# Patient Record
Sex: MALE | NOT HISPANIC OR LATINO | Employment: FULL TIME | URBAN - METROPOLITAN AREA
[De-identification: names, ages, dates, MRNs, and addresses within clinical notes are randomized per-mention and may not be internally consistent; named-entity substitution may affect disease eponyms.]

---

## 2023-07-26 ENCOUNTER — HOSPITAL ENCOUNTER (EMERGENCY)
Facility: CLINIC | Age: 33
Discharge: HOME | End: 2023-07-26
Attending: PHYSICIAN ASSISTANT | Admitting: PHYSICIAN ASSISTANT
Payer: OTHER MISCELLANEOUS

## 2023-07-26 VITALS
BODY MASS INDEX: 21.22 KG/M2 | HEART RATE: 60 BPM | RESPIRATION RATE: 18 BRPM | SYSTOLIC BLOOD PRESSURE: 141 MMHG | HEIGHT: 68 IN | DIASTOLIC BLOOD PRESSURE: 82 MMHG | TEMPERATURE: 98 F | WEIGHT: 140 LBS

## 2023-07-26 DIAGNOSIS — S05.01XA ABRASION OF RIGHT CORNEA, INITIAL ENCOUNTER: ICD-10-CM

## 2023-07-26 PROCEDURE — 250N000011 HC RX IP 250 OP 636: Performed by: EMERGENCY MEDICINE

## 2023-07-26 PROCEDURE — 250N000009 HC RX 250: Performed by: PHYSICIAN ASSISTANT

## 2023-07-26 PROCEDURE — 90715 TDAP VACCINE 7 YRS/> IM: CPT | Performed by: EMERGENCY MEDICINE

## 2023-07-26 PROCEDURE — 99283 EMERGENCY DEPT VISIT LOW MDM: CPT | Mod: 25

## 2023-07-26 PROCEDURE — 90471 IMMUNIZATION ADMIN: CPT | Performed by: EMERGENCY MEDICINE

## 2023-07-26 RX ORDER — POLYMYXIN B SULFATE AND TRIMETHOPRIM 1; 10000 MG/ML; [USP'U]/ML
1 SOLUTION OPHTHALMIC EVERY 4 HOURS
Qty: 2 ML | Refills: 0 | Status: SHIPPED | OUTPATIENT
Start: 2023-07-26 | End: 2023-07-31

## 2023-07-26 RX ORDER — PROPARACAINE HYDROCHLORIDE 5 MG/ML
1 SOLUTION/ DROPS OPHTHALMIC ONCE
Status: COMPLETED | OUTPATIENT
Start: 2023-07-26 | End: 2023-07-26

## 2023-07-26 RX ADMIN — CLOSTRIDIUM TETANI TOXOID ANTIGEN (FORMALDEHYDE INACTIVATED), CORYNEBACTERIUM DIPHTHERIAE TOXOID ANTIGEN (FORMALDEHYDE INACTIVATED), BORDETELLA PERTUSSIS TOXOID ANTIGEN (GLUTARALDEHYDE INACTIVATED), BORDETELLA PERTUSSIS FILAMENTOUS HEMAGGLUTININ ANTIGEN (FORMALDEHYDE INACTIVATED), BORDETELLA PERTUSSIS PERTACTIN ANTIGEN, AND BORDETELLA PERTUSSIS FIMBRIAE 2/3 ANTIGEN 0.5 ML: 5; 2; 2.5; 5; 3; 5 INJECTION, SUSPENSION INTRAMUSCULAR at 18:36

## 2023-07-26 RX ADMIN — PROPARACAINE HYDROCHLORIDE 1 DROP: 5 SOLUTION/ DROPS OPHTHALMIC at 18:31

## 2023-07-26 RX ADMIN — FLUORESCEIN SODIUM 1 STRIP: 1 STRIP OPHTHALMIC at 18:30

## 2023-07-26 ASSESSMENT — VISUAL ACUITY
OD: 20/15;WITHOUT CORRECTIVE LENSES
OS: 20/20;WITHOUT CORRECTIVE LENSES

## 2023-07-26 ASSESSMENT — ACTIVITIES OF DAILY LIVING (ADL): ADLS_ACUITY_SCORE: 35

## 2023-07-26 NOTE — ED PROVIDER NOTES
Rapid Assessment Note    History:   Gregg Jason is a 32 year old male who presents with right eye pain. The patient reports that around 1542 while he was at work today a small wooden piece flew into his right eye. He denies any vision changes. He tried to flush the eye out multiple times but still has pain. He does not wear glasses or contacts. He is not sure when his last tetanus was.  He has no other complaints.    Exam:   General:  Alert, interactive  Cardiovascular:  Well perfused  Lungs:  No respiratory distress, no accessory muscle use  Neuro:  Moving all 4 extremities  Skin:  Warm, dry  Psych:  Normal affect      Plan of Care:   I evaluated the patient and developed an initial plan of care. I discussed this plan and explained that I, or one of my partners, would be returning to complete the evaluation.  Tdap ordered.  Patient will require being placed in a room so that he can have a more thorough eye exam completed.    I, Ismael Concepcion, am serving as a scribe to document services personally performed by Vernon Gonzales MD, based on my observations and the provider's statements to me.    7/26/2023  [facility]    Portions of this medical record were completed by a scribe. UPON MY REVIEW AND AUTHENTICATION BY ELECTRONIC SIGNATURE, this confirms (a) I performed the applicable clinical services, and (b) the record is accurate.        Vernon Gonzales MD  07/26/23 5699

## 2023-07-26 NOTE — ED TRIAGE NOTES
"Pt reports \"splinter\" flew into R eye, now having eye pain. No vision change. Attempted to flush eye PTA     Triage Assessment       Row Name 07/26/23 3256       Triage Assessment (Adult)    Airway WDL WDL       Respiratory WDL    Respiratory WDL WDL       Skin Circulation/Temperature WDL    Skin Circulation/Temperature WDL WDL       Cardiac WDL    Cardiac WDL WDL       Peripheral/Neurovascular WDL    Peripheral Neurovascular WDL WDL       Cognitive/Neuro/Behavioral WDL    Cognitive/Neuro/Behavioral WDL WDL                    "

## 2023-07-26 NOTE — ED PROVIDER NOTES
"  History     Chief Complaint:  Eye Pain     The history is provided by the patient.      Gregg Jason is a 32 year old male with no past pertinent medical history who presents to the emergency department for eye pain. The patient states that today at 1500 a \"wood splinter\" flew into his right eye at a warehouse and is now experiencing eye pain. Patient adds he attempted to flush eye with water prior to appointment. Denies wearing contacts. Denies vision changes.    Independent Historian:   None - Patient Only    Review of External Notes:   none    Medications:    The patient is currently on no regular medications.    Past Medical History:    The patient has no past pertinent medical history.    Physical Exam   Patient Vitals for the past 24 hrs:   BP Temp Temp src Pulse Resp Height Weight   07/26/23 1745 (!) 141/82 98  F (36.7  C) Temporal 60 18 1.727 m (5' 8\") 63.5 kg (140 lb)      Physical Exam  General: Well appearing.  No acute distress.  HENT:  Scalp AT/NC. No facial swelling, redness, bruising or deformity.  Eyes:  PEERL, Extra occular movements intact without pain.  No proptosis.    Conjunctiva are normal appearing.  No lid swelling, crusting or discharge.    No foreign bodies present with eversion of lids.  Exam of eye with fluoroscein dye reveals very small 1 to 2 mm area of increased uptake at approximately 7 o'clock position fairly peripherally on the cornea consistent with abrasion.  Negative seidels sign.  No dendritic lesions, ulcers. Anterior chamber appears grossly clear w/out hyphema, hypopion, or significant flare.    Right Eye 20/15     Left Eye 20/20  CV:  Regular rate and rhythm    No pathologic murmur, rubs, or gallops.  Resp:  Breath sounds are clear bilaterally.   Non-labored.  Neuro:  Alert and Oriented.    GCS: 15    No facial asymmetry  Skin:  No rashes or lesions.  Psych:  alert, appropriate interactions. Cooperative      Emergency Department Course       Emergency Department Course & " Assessments:       Interventions:  Medications   Tdap (tetanus-diphtheria-acell pertussis) (ADACEL) injection 0.5 mL (0.5 mLs Intramuscular $Given 7/26/23 1836)   fluorescein (FUL-LIZ) ophthalmic strip 1 strip (1 strip Right Eye $Given 7/26/23 1830)   proparacaine (ALCAINE) 0.5 % ophthalmic solution 1 drop (1 drop Right Eye $Given 7/26/23 1831)      Assessments:  1912 I obtained history and examined the patient as noted above.     Independent Interpretation (X-rays, CTs, rhythm strip):  None    Consultations/Discussion of Management or Tests:  None     Social Determinants of Health affecting care:   None    Disposition:  The patient was discharged to home.     Impression & Plan      Medical Decision Making:  Patient presents with right eye pain fb sensation after getting wood splinter possibly in it earlier.  Flushed it thoroughly after but still mild fb sensation. Broad differential was considered.  The exam is significant for abnormality on fluorescein exam. This is consistent with corneal abrasion. No foreign bodies in eyes or lids noted.  No evidence of ruptured globe, intra-occular foreign body, hyphema, retro-bulbar hematoma, orbital fracture, lens dislocation, retinal detachment, or traumatic iritis.  No evidence of other serious non-traumatic eye disease such as corneal ulcer, dendritic lesions, endopthalmitis, angle closure glaucoma, etc.    Patient will be placed on antibiotic drops as below,  Discussed symptomatic treatment for pain control. Patient was given follow-up for ophthalmology, with instruction to schedule an appointment to be seen in 1-2 days.  Return to ED if new or worsening symptoms.      Diagnosis:    ICD-10-CM    1. Abrasion of right cornea, initial encounter  S05.01XA            Discharge Medications:  Discharge Medication List as of 7/26/2023  7:20 PM        START taking these medications    Details   trimethoprim-polymyxin b (POLYTRIM) 50742-5.1 UNIT/ML-% ophthalmic solution Place 1  drop into the right eye every 4 hours for 5 days, Disp-2 mL, R-0, E-Prescribe            Scribe Disclosure:  I, Deshawn Mccloud, am serving as a scribe at 7:22 PM on 7/26/2023 to document services personally performed by Michele Blake, based on my observations and the provider's statements to me.     7/26/2023   Michele Blake,       Michele Blake, PA-C  07/26/23 1927

## 2023-12-20 ENCOUNTER — HOSPITAL ENCOUNTER (EMERGENCY)
Facility: HOSPITAL | Age: 33
Discharge: HOME | End: 2023-12-20
Attending: EMERGENCY MEDICINE

## 2023-12-20 VITALS
TEMPERATURE: 97.4 F | OXYGEN SATURATION: 100 % | WEIGHT: 133 LBS | DIASTOLIC BLOOD PRESSURE: 87 MMHG | SYSTOLIC BLOOD PRESSURE: 129 MMHG | HEIGHT: 68 IN | RESPIRATION RATE: 16 BRPM | HEART RATE: 70 BPM | BODY MASS INDEX: 20.16 KG/M2

## 2023-12-20 DIAGNOSIS — R00.2 PALPITATIONS: Primary | ICD-10-CM

## 2023-12-20 DIAGNOSIS — R06.02 SHORTNESS OF BREATH: ICD-10-CM

## 2023-12-20 LAB
ALBUMIN SERPL-MCNC: 4.5 G/DL (ref 3.5–5.7)
ALP SERPL-CCNC: 36 IU/L (ref 34–125)
ALT SERPL-CCNC: 13 IU/L (ref 7–52)
ANION GAP SERPL CALC-SCNC: 6 MEQ/L (ref 3–15)
AST SERPL-CCNC: 18 IU/L (ref 13–39)
BASOPHILS # BLD: 0.05 K/UL (ref 0.01–0.1)
BASOPHILS NFR BLD: 1 %
BILIRUB SERPL-MCNC: 0.6 MG/DL (ref 0.3–1.2)
BILIRUB UR QL STRIP.AUTO: NEGATIVE MG/DL
BUN SERPL-MCNC: 22 MG/DL (ref 7–25)
CALCIUM SERPL-MCNC: 9.5 MG/DL (ref 8.6–10.3)
CHLORIDE SERPL-SCNC: 105 MEQ/L (ref 98–107)
CLARITY UR REFRACT.AUTO: CLEAR
CO2 SERPL-SCNC: 27 MEQ/L (ref 21–31)
COLOR UR AUTO: COLORLESS
CREAT SERPL-MCNC: 1.1 MG/DL (ref 0.7–1.3)
DIFFERENTIAL METHOD BLD: ABNORMAL
EGFRCR SERPLBLD CKD-EPI 2021: >60 ML/MIN/1.73M*2
EOSINOPHIL # BLD: 0.28 K/UL (ref 0.04–0.54)
EOSINOPHIL NFR BLD: 5.4 %
ERYTHROCYTE [DISTWIDTH] IN BLOOD BY AUTOMATED COUNT: 13.3 % (ref 11.6–14.4)
GLUCOSE SERPL-MCNC: 121 MG/DL (ref 70–99)
GLUCOSE UR STRIP.AUTO-MCNC: NEGATIVE MG/DL
HCT VFR BLDCO AUTO: 40.5 % (ref 40.1–51)
HGB BLD-MCNC: 13.1 G/DL (ref 13.7–17.5)
HGB UR QL STRIP.AUTO: NEGATIVE
IMM GRANULOCYTES # BLD AUTO: 0 K/UL (ref 0–0.08)
IMM GRANULOCYTES NFR BLD AUTO: 0 %
KETONES UR STRIP.AUTO-MCNC: NEGATIVE MG/DL
LEUKOCYTE ESTERASE UR QL STRIP.AUTO: NEGATIVE
LYMPHOCYTES # BLD: 2.12 K/UL (ref 1.2–3.5)
LYMPHOCYTES NFR BLD: 41.2 %
MCH RBC QN AUTO: 25.1 PG (ref 28–33.2)
MCHC RBC AUTO-ENTMCNC: 32.3 G/DL (ref 32.2–36.5)
MCV RBC AUTO: 77.7 FL (ref 83–98)
MONOCYTES # BLD: 0.29 K/UL (ref 0.3–1)
MONOCYTES NFR BLD: 5.6 %
NEUTROPHILS # BLD: 2.41 K/UL (ref 1.7–7)
NEUTS SEG NFR BLD: 46.8 %
NITRITE UR QL STRIP.AUTO: NEGATIVE
NRBC BLD-RTO: 0 %
PDW BLD AUTO: 9.8 FL (ref 9.4–12.4)
PH UR STRIP.AUTO: 6 [PH]
PLATELET # BLD AUTO: 221 K/UL (ref 150–350)
POTASSIUM SERPL-SCNC: 3.7 MEQ/L (ref 3.5–5.1)
PROT SERPL-MCNC: 7.5 G/DL (ref 6–8.2)
PROT UR QL STRIP.AUTO: NEGATIVE
RBC # BLD AUTO: 5.21 M/UL (ref 4.5–5.8)
SODIUM SERPL-SCNC: 138 MEQ/L (ref 136–145)
SP GR UR REFRACT.AUTO: 1.01
TROPONIN I SERPL HS-MCNC: <2 PG/ML
UROBILINOGEN UR STRIP-ACNC: 0.2 EU/DL
WBC # BLD AUTO: 5.15 K/UL (ref 3.8–10.5)

## 2023-12-20 PROCEDURE — 93005 ELECTROCARDIOGRAM TRACING: CPT

## 2023-12-20 PROCEDURE — 36415 COLL VENOUS BLD VENIPUNCTURE: CPT | Performed by: EMERGENCY MEDICINE

## 2023-12-20 PROCEDURE — 99283 EMERGENCY DEPT VISIT LOW MDM: CPT

## 2023-12-20 PROCEDURE — 93005 ELECTROCARDIOGRAM TRACING: CPT | Performed by: EMERGENCY MEDICINE

## 2023-12-20 PROCEDURE — 81003 URINALYSIS AUTO W/O SCOPE: CPT

## 2023-12-20 PROCEDURE — 80053 COMPREHEN METABOLIC PANEL: CPT | Performed by: EMERGENCY MEDICINE

## 2023-12-20 PROCEDURE — 84484 ASSAY OF TROPONIN QUANT: CPT | Performed by: EMERGENCY MEDICINE

## 2023-12-20 PROCEDURE — 85025 COMPLETE CBC W/AUTO DIFF WBC: CPT | Performed by: EMERGENCY MEDICINE

## 2023-12-20 ASSESSMENT — ENCOUNTER SYMPTOMS
BACK PAIN: 0
SHORTNESS OF BREATH: 1
FLANK PAIN: 0
SINUS PAIN: 0
VOMITING: 0
DIZZINESS: 0
EYE ITCHING: 0
PHOTOPHOBIA: 0
CHEST TIGHTNESS: 0
PALPITATIONS: 1
CHILLS: 0
NAUSEA: 0
EYE REDNESS: 0
EYE DISCHARGE: 0
CHOKING: 0
DIFFICULTY URINATING: 0
APPETITE CHANGE: 0
ABDOMINAL PAIN: 0
HEADACHES: 0
LIGHT-HEADEDNESS: 0
SINUS PRESSURE: 0
COUGH: 0
WHEEZING: 0
EYE PAIN: 0
ARTHRALGIAS: 0
FATIGUE: 1
ABDOMINAL DISTENTION: 0
ACTIVITY CHANGE: 0

## 2023-12-21 LAB
ATRIAL RATE: 62
P AXIS: 66
PR INTERVAL: 144
QRS DURATION: 102
QT INTERVAL: 426
QTC CALCULATION(BAZETT): 432
R AXIS: 75
T WAVE AXIS: 65
VENTRICULAR RATE: 62

## 2023-12-21 NOTE — DISCHARGE INSTRUCTIONS
You were seen in the emergency department today for evaluation of palpitations, shortness of breath, side pain.  Your lab work today is reassuring.  We believe this is most likely related to exhaustion.  Please make sure you are getting adequate amount of sleep each night.  Please follow-up with cardiology outpatient for further evaluation and management of your palpitations.    Please return to the ED with any new or worsening symptoms

## 2023-12-21 NOTE — ED ATTESTATION NOTE
Procedures  Physical Exam  Review of Systems  [place]    12/21/202312:10 AM  I have personally seen and examined the patient.  I personally performed the key components of the encounter and provided a significant portion of the care and medical decision making for this patient. I reviewed and agree with the PA/NP/Resident's assessment and plan of care, with any exceptions as documented below    My focused history, examination, assessment, and plan of care of Jose E Gomez is as follows:  The history is provided by Patient  The patient is a 33 y.o. who comes to the ED for palpitations, overall intermittent over the past couple months.  Patient notes that he has been having them little bit more frequently, they are often associated with significant caffeine intake.  In addition the patient has been noticing some intermittent discomfort to his kidney, he denies any trauma, denies any falls, denies any hematuria, denies any pain with urination.  He does note he has been working significant hours over 100 hours a week and has been sleeping poorly.    Pertinent past medical history includes: Denies  History reviewed. No pertinent past medical history.      History reviewed. No pertinent surgical history.    Exam: Well-appearing, normal vitals, no significant flank findings      Impression/Plan/Medical decision making/ED course: 33-year-old presenting with palpitations, some symptoms of intermittent blurry vision, shortness of breath, patient with significant anxiety significant increased work schedule and significant hours at work, patient worked up with differential including consideration for anxiety, insomnia, electrolyte abnormality, troponin was normal, urine was normal making  type reasons for his flank pain less likely, his hemoglobin was close to normal and white blood count was normal, as his hemoglobin is little bit low and his MCV is little bit low patient may be suffering from a little bit of anemia although  there is no clear signs of blood loss making this more of a microcytic type of anemia, overall I think his diet and his work schedule is likely the largest culprit is long with his poor sleep.  Discussed the need for good diet, regimen and sleep schedule, decrease caffeine, he is otherwise to follow-up closely and return for any new or worsening symptoms.       The patient The patient was discharged after careful consideration for any admission needs    Medications - No data to display    ECG review: ECG read by me: As Below   ECG 12 lead   ED Interpretation   Normal sinus rhythm, no ST elevation, no significant T wave changes          While here I have   Reviewed previous records in our system  Reviewed prior outpatient and ER visits  Reviewed care everywhere for outside records Reviewed prior outpatient and ER visits  Reviewed and interpreted lab results As above  Reviewed and interpreted xrays N/A  Compared lab results to previous lab results N/A  Compared image results to previous results N/A  Called and spoke with a consultant or physician about this case N/A  Reviewed and independently interpreted ultrasound results and images N/A  Reviewed and independently interpreted CT imaging results and images N/A    Vital Signs Review: Vital signs have been reviewed. The oxygen saturation is SpO2: 98 %  which is Normal      I was physically present for the key/critical portions of the following procedures: none     Claude Haq MD  12/21/23 0021

## 2023-12-21 NOTE — ED PROVIDER NOTES
"Emergency Medicine Note  HPI   HISTORY OF PRESENT ILLNESS     Patient is a 33-year-old male with history of anxiety presenting to the emergency department for evaluation of palpitations that have been intermittent x 7 weeks and shortness of breath with exertion that started approximately 30 minutes prior to arrival.  Patient states he has had palpitations at night he finds consistent with caffeine intake over the past several weeks that are been intermittent.  He has been trying to cut back on his caffeine and notes improvement in his palpitations.  He states today while he was at work he felt short of breath for approximately 20 minutes but states that he was exerting himself a lot.  He notes that he has been working 90-hour weeks for the past few weeks and thinks a lot of his symptoms are secondary to fatigue.  He notes an episode of blurred vision bilaterally lasted a couple minutes prior to arrival, however resolved prior to my exam.  He also notes \"kidney pain\" has been intermittent for the past few weeks as well and points to his left mid ribs with this.  Currently denies chest pain, flank pain, back pain abdominal pain, nausea, vomiting.  He notes intermittent alcohol use.  He denies cigarette smoking, stating he quit in 2014.            Patient History   PAST HISTORY     Reviewed from Nursing Triage:       History reviewed. No pertinent past medical history.    History reviewed. No pertinent surgical history.    History reviewed. No pertinent family history.    Social History     Tobacco Use   • Smoking status: Former     Types: Cigarettes   • Smokeless tobacco: Never   Substance Use Topics   • Alcohol use: Yes     Comment: occasionally   • Drug use: Never         Review of Systems   REVIEW OF SYSTEMS     Review of Systems   Constitutional: Positive for fatigue. Negative for activity change, appetite change and chills.   HENT: Negative for congestion, sinus pressure and sinus pain.    Eyes: Positive for " visual disturbance. Negative for photophobia, pain, discharge, redness and itching.   Respiratory: Positive for shortness of breath. Negative for cough, choking, chest tightness and wheezing.    Cardiovascular: Positive for palpitations. Negative for chest pain.   Gastrointestinal: Negative for abdominal distention, abdominal pain, nausea and vomiting.   Genitourinary: Negative for difficulty urinating and flank pain.   Musculoskeletal: Negative for arthralgias and back pain.   Neurological: Negative for dizziness, light-headedness and headaches.         VITALS     ED Vitals    Date/Time Temp Pulse Resp BP SpO2 [facility]   12/20/23 2131 -- -- -- 129/87 -- SDB   12/20/23 2130 36.3 °C (97.4 °F) 70 16 -- 100 % SDB        Pulse Ox %: 100 % (12/20/23 2211)  Pulse Ox Interpretation: Normal (12/20/23 2211)  Heart Rate: 70 (12/20/23 2211)  Rhythm Strip Interpretation: Normal Sinus Rhythm (12/20/23 2211)     Physical Exam   PHYSICAL EXAM     Physical Exam  Vitals and nursing note reviewed.   Constitutional:       Appearance: He is well-developed and normal weight.   HENT:      Head: Normocephalic and atraumatic.   Cardiovascular:      Rate and Rhythm: Normal rate and regular rhythm.      Comments: Heart is regular rate and rhythm.  No murmurs rubs or gallops appreciated  Pulmonary:      Effort: Pulmonary effort is normal. No tachypnea or bradypnea.      Breath sounds: Normal breath sounds. No decreased breath sounds, wheezing, rhonchi or rales.      Comments: Lungs clear to auscultation bilaterally.  No signs of respiratory distress.  Chest:      Chest wall: No mass or deformity.   Abdominal:      Palpations: Abdomen is soft.   Musculoskeletal:         General: Normal range of motion.      Cervical back: Normal range of motion.      Right lower leg: No tenderness. No edema.      Left lower leg: No tenderness. No edema.   Skin:     General: Skin is warm and dry.   Neurological:      General: No focal deficit present.      Mental  Status: He is alert and oriented to person, place, and time.   Psychiatric:         Mood and Affect: Mood normal.         Behavior: Behavior normal.           PROCEDURES     Procedures     DATA     Results     Procedure Component Value Units Date/Time    HS Troponin (with 2 hour reflex) [[id number]]  (Normal) Collected: 12/20/23 2138    Specimen: Blood, Venous Updated: 12/20/23 2236     High Sens Troponin I <2.0 pg/mL     Narrative:      Manual Entry Confirmed      UA w/ reflex culture (ED Only) [[id number]]  (Normal) Collected: 12/20/23 2211    Specimen: Urine, Clean Catch Updated: 12/20/23 2220    Narrative:      The following orders were created for panel order UA w/ reflex culture (ED Only).  Procedure                               Abnormality         Status                     ---------                               -----------         ------                     UA Reflex to Culture (Ma...[[id number]]  Normal              Final result                 Please view results for these tests on the individual orders.    UA Reflex to Culture (Macroscopic) [[id number]]  (Normal) Collected: 12/20/23 2211    Specimen: Urine, Clean Catch Updated: 12/20/23 2220     Color, Urine Colorless     Clarity, Urine Clear     Specific Gravity, Urine 1.009     pH, Urine 6.0     Leukocyte Esterase Negative     Comment: Results can be falsely negative due to high specific gravity, some antibiotics, glucose >3 g/dl, or WBC other than neutrophils.        Nitrite, Urine Negative     Protein, Urine Negative     Glucose, Urine Negative mg/dL      Ketones, Urine Negative mg/dL      Urobilinogen, Urine 0.2 EU/dL      Bilirubin, Urine Negative mg/dL      Blood, Urine Negative     Comment: The sensitivity of the occult blood test is equivalent to approximately 4 intact RBC/HPF.       Comprehensive metabolic panel [[id number]]  (Abnormal) Collected: 12/20/23 2138    Specimen: Blood, Venous Updated: 12/20/23 2215     Sodium 138 mEQ/L      Potassium  3.7 mEQ/L      Comment: Results obtained on plasma. Plasma Potassium values may be up to 0.4 mEQ/L less than serum values. The differences may be greater for patients with high platelet or white cell counts.        Chloride 105 mEQ/L      CO2 27 mEQ/L      BUN 22 mg/dL      Creatinine 1.1 mg/dL      Glucose 121 mg/dL      Calcium 9.5 mg/dL      AST (SGOT) 18 IU/L      ALT (SGPT) 13 IU/L      Alkaline Phosphatase 36 IU/L      Total Protein 7.5 g/dL      Comment: Test performed on plasma which typically contains approximately 0.4 g/dL more protein than serum.        Albumin 4.5 g/dL      Bilirubin, Total 0.6 mg/dL      eGFR >60.0 mL/min/1.73m*2      Comment: Calculation based on the Chronic Kidney Disease Epidemiology Collaboration (CKD-EPI) equation refit without adjustment for race.        Anion Gap 6 mEQ/L     CBC and differential [[id number]]  (Abnormal) Collected: 12/20/23 2138    Specimen: Blood, Venous Updated: 12/20/23 2147     WBC 5.15 K/uL      RBC 5.21 M/uL      Hemoglobin 13.1 g/dL      Hematocrit 40.5 %      MCV 77.7 fL      MCH 25.1 pg      MCHC 32.3 g/dL      RDW 13.3 %      Platelets 221 K/uL      MPV 9.8 fL      Differential Type Auto     nRBC 0.0 %      Immature Granulocytes 0.0 %      Neutrophils 46.8 %      Lymphocytes 41.2 %      Monocytes 5.6 %      Eosinophils 5.4 %      Basophils 1.0 %      Immature Granulocytes, Absolute 0.00 K/uL      Neutrophils, Absolute 2.41 K/uL      Lymphocytes, Absolute 2.12 K/uL      Monocytes, Absolute 0.29 K/uL      Eosinophils, Absolute 0.28 K/uL      Basophils, Absolute 0.05 K/uL     RAINBOW LT BLUE [[id number]] Collected: 12/20/23 2138    Specimen: Blood, Venous Updated: 12/20/23 2143    RAINBOW GOLD [[id number]] Collected: 12/20/23 2138    Specimen: Blood, Venous Updated: 12/20/23 2143    [place] Draw Panel [[id number]] Collected: 12/20/23 2138    Specimen: Blood, Venous Updated: 12/20/23 2143    Narrative:      The following orders were created for panel order  "[place] Draw Panel.  Procedure                               Abnormality         Status                     ---------                               -----------         ------                     RAINBOW RED[[id number]]                                      In process                 RAINBOW LT BLUE[[id number]]                                  In process                 RAINBOW GOLD[[id number]]                                     In process                   Please view results for these tests on the individual orders.    RAINBOW RED [[id number]] Collected: 12/20/23 2138    Specimen: Blood, Venous Updated: 12/20/23 2143          Imaging Results    None         ECG 12 lead   Independent Interpretation by ED Provider   Normal sinus rhythm, no ST elevation, no significant T wave changes          Scoring tools                                  ED Course & MDM   MDM / ED COURSE / CLINICAL IMPRESSION / DISPO     Medical Decision Making  Patient is a 33-year-old male presenting to the emergency department for evaluation of multiple concerns including palpitations, shortness of breath, flank pain, blurred vision all of which have been ongoing and intermittent for the past several weeks except for his episode of blurred vision which happened suddenly prior to arrival but resolved prior to getting here.  The patient states that he has been working roughly 90 hours/week over the past 2 weeks and attributes a lot of his symptoms secondary to fatigue as he is not getting a lot of sleep.  He notes his palpitations have been improved over the past few weeks as he has decreased his caffeine consumption.  He also notes \"kidney pain\" at the left flank has been intermittent for the past few weeks as well but currently has no pain.  Patient is well-appearing on exam has no tenderness of patient of the chest, abdomen, flank, back.  Lungs clear to auscultation bilaterally.  Heart is regular rate and rhythm.Lab work today reassuring with no " blood in the urine, no signs of UTI and UA, troponin negative x 1, normal electrolytes, normal white blood cell count.  EKG revealed normal sinus rhythm no acute ischemic changes.  Discussed with the patient we believe most of the symptoms most likely related to fatigue/exhaustion, however recommend he follow-up with cardiology for further evaluation and management.  The patient is not from [place], butt is currently here working, he has a flight back home tomorrow and we recommend that he follow-up with his PCP as well as a cardiologist back home.  He expressed understanding and agreement with this plan and was discharged.  Return precautions addressed.    Amount and/or Complexity of Data Reviewed  Labs: ordered. Decision-making details documented in ED Course.  ECG/medicine tests: ordered and independent interpretation performed.          ED Course as of 12/20/23 2343   Wed Dec 20, 2023   2222 Impression: Palpitations, shortness of breath, flank pain, blurred vision all resolved prior to arrival    Plan: Labs, UA, troponin, EKG, reeval [MO]   2223 Seen and discussed with Dr. Kellogg [MO]   2224 Sodium: 138 [MO]   2224 Potassium, Bld: 3.7 [MO]   2224 Leukocyte Esterase: Negative [MO]   2224 Nitrite, Urine: Negative [MO]   2224 Blood, Urine: Negative [MO]   2224 WBC: 5.15 [MO]   2240 High Sens Troponin I: <2.0 [MO]      ED Course User Index  [MO] Rojelio Hector PA C     Clinical Impression      Palpitations   Shortness of breath     _________________     ED Disposition   Discharge                   Rojelio Hector PA C  12/20/23 2343